# Patient Record
Sex: MALE | RURAL
[De-identification: names, ages, dates, MRNs, and addresses within clinical notes are randomized per-mention and may not be internally consistent; named-entity substitution may affect disease eponyms.]

---

## 2023-12-07 ENCOUNTER — APPOINTMENT (OUTPATIENT)
Dept: RURAL SURGERY 2 | Age: 53
Setting detail: DERMATOLOGY
End: 2023-12-07

## 2023-12-07 VITALS — HEIGHT: 68 IN | WEIGHT: 205 LBS

## 2023-12-07 DIAGNOSIS — L30.0 NUMMULAR DERMATITIS: ICD-10-CM

## 2023-12-07 PROCEDURE — OTHER COUNSELING: OTHER

## 2023-12-07 PROCEDURE — OTHER PRESCRIPTION MEDICATION MANAGEMENT: OTHER

## 2023-12-07 PROCEDURE — 99204 OFFICE O/P NEW MOD 45 MIN: CPT

## 2023-12-07 PROCEDURE — OTHER MIPS QUALITY: OTHER

## 2023-12-07 PROCEDURE — OTHER PRESCRIPTION: OTHER

## 2023-12-07 PROCEDURE — OTHER ADDITIONAL NOTES: OTHER

## 2023-12-07 RX ORDER — TRIAMCINOLONE ACETONIDE 1 MG/G
CREAM TOPICAL BID
Qty: 453.6 | Refills: 3 | Status: ERX | COMMUNITY
Start: 2023-12-07

## 2023-12-07 ASSESSMENT — LOCATION DETAILED DESCRIPTION DERM
LOCATION DETAILED: PERIUMBILICAL SKIN
LOCATION DETAILED: RIGHT PROXIMAL POSTERIOR UPPER ARM
LOCATION DETAILED: LEFT ANTERIOR DISTAL THIGH
LOCATION DETAILED: INFERIOR THORACIC SPINE
LOCATION DETAILED: RIGHT MEDIAL UPPER BACK
LOCATION DETAILED: RIGHT ANTERIOR PROXIMAL THIGH
LOCATION DETAILED: LEFT ANTERIOR PROXIMAL THIGH
LOCATION DETAILED: RIGHT ANTERIOR DISTAL THIGH
LOCATION DETAILED: LEFT PROXIMAL POSTERIOR UPPER ARM

## 2023-12-07 ASSESSMENT — LOCATION SIMPLE DESCRIPTION DERM
LOCATION SIMPLE: RIGHT UPPER ARM
LOCATION SIMPLE: RIGHT UPPER BACK
LOCATION SIMPLE: RIGHT THIGH
LOCATION SIMPLE: ABDOMEN
LOCATION SIMPLE: LEFT THIGH
LOCATION SIMPLE: UPPER BACK
LOCATION SIMPLE: LEFT UPPER ARM

## 2023-12-07 ASSESSMENT — LOCATION ZONE DERM
LOCATION ZONE: LEG
LOCATION ZONE: ARM
LOCATION ZONE: TRUNK

## 2023-12-07 NOTE — PROCEDURE: PRESCRIPTION MEDICATION MANAGEMENT
Detail Level: Zone
Render In Strict Bullet Format?: No
Initiate Treatment: triamcinolone acetonide 0.1 % topical cream BID\\nQuantity: 453.6 g  Days Supply: 30\\nSig: Apply BID X 2 weeks to affected area below neck, then decrease to BID up to 3 days a week as needed.

## 2023-12-07 NOTE — PROCEDURE: ADDITIONAL NOTES
Detail Level: Simple
Render Risk Assessment In Note?: no
Additional Notes: Moisturize daily, samples of Eucerin given. \\nTurn down the temperature of the shower so it is not so hot.

## 2023-12-07 NOTE — HPI: RASH
Is This A New Presentation, Or A Follow-Up?: Rash
Additional History: Rash is not always itchy. Rash stays. No one else in household has a similar rash. \\n\\nDiscussed the rash with Katrin at [facility] at two different appts. Prescribed triamcinolone cream, applied to first place, got a little better then it returned.\\n\\n No changes in medicines, no changes in soaps/detergents, no new pets.

## 2023-12-07 NOTE — PROCEDURE: MIPS QUALITY
Name And Contact Information For Health Care Proxy: Mary Grace Ontiveros, Wife, [phone]
Quality 110: Preventive Care And Screening: Influenza Immunization: Influenza immunization was not ordered or administered, reason not given
Quality 431: Preventive Care And Screening: Unhealthy Alcohol Use - Screening: Patient not identified as an unhealthy alcohol user when screened for unhealthy alcohol use using a systematic screening method
Detail Level: Detailed
Quality 226: Preventive Care And Screening: Tobacco Use: Screening And Cessation Intervention: Patient screened for tobacco use and is an ex/non-smoker